# Patient Record
Sex: MALE | URBAN - METROPOLITAN AREA
[De-identification: names, ages, dates, MRNs, and addresses within clinical notes are randomized per-mention and may not be internally consistent; named-entity substitution may affect disease eponyms.]

---

## 2019-05-08 ENCOUNTER — OFFICE VISIT (OUTPATIENT)
Dept: URBAN - METROPOLITAN AREA CLINIC 58 | Facility: CLINIC | Age: 79
End: 2019-05-08
Payer: MEDICARE

## 2019-05-08 DIAGNOSIS — H18.52 EPITHELIAL CORNEAL DYSTROPHY: Primary | ICD-10-CM

## 2019-05-08 DIAGNOSIS — H02.883 MEIBOMIAN GLAND DYSFUNCTION OF RIGHT EYE, UNSPECIFIED EYELID: ICD-10-CM

## 2019-05-08 PROCEDURE — 99204 OFFICE O/P NEW MOD 45 MIN: CPT | Performed by: OPTOMETRIST

## 2019-05-08 PROCEDURE — 92133 CPTRZD OPH DX IMG PST SGM ON: CPT | Performed by: OPTOMETRIST

## 2019-05-08 RX ORDER — FLUOROMETHOLONE 1 MG/ML
0.1 % SUSPENSION/ DROPS OPHTHALMIC
Qty: 5 | Refills: 0 | Status: INACTIVE
Start: 2019-05-08 | End: 2019-06-19

## 2019-05-08 ASSESSMENT — INTRAOCULAR PRESSURE
OD: 13
OS: 13

## 2019-05-08 NOTE — IMPRESSION/PLAN
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 Plan: Discussed diagnosis in detail with patient. Discussed treatment options with patient. Patient instructed to apply warm compresses. Will continue to observe.

## 2019-05-08 NOTE — IMPRESSION/PLAN
Impression: Drusen of optic disc, bilateral: H47.323. Plan: Discussed diagnosis in detail with patient. Discussed treatment options with patient. Cont. Simbrinza OU BID.   Pt says he was previously diagnosed with glaucoma

## 2019-05-08 NOTE — IMPRESSION/PLAN
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 Plan: Discussed diagnosis in detail with patient. Discussed treatment options with patient. Ruthann Perez PRN.

## 2019-05-08 NOTE — IMPRESSION/PLAN
Impression: Dry eye syndrome of bilateral lacrimal glands: H04.123. Plan: Diagnosis explained in detail, FML .1% 1gtt OU tid x 1 wk then bid x 1 wk then qd x 1 wk then d/c.  Will continue to monitor the condition

## 2019-06-19 ENCOUNTER — OFFICE VISIT (OUTPATIENT)
Dept: URBAN - METROPOLITAN AREA CLINIC 58 | Facility: CLINIC | Age: 79
End: 2019-06-19
Payer: MEDICARE

## 2019-06-19 DIAGNOSIS — H04.123 DRY EYE SYNDROME OF BILATERAL LACRIMAL GLANDS: Primary | ICD-10-CM

## 2019-06-19 DIAGNOSIS — H47.323 DRUSEN OF OPTIC DISC, BILATERAL: ICD-10-CM

## 2019-06-19 PROCEDURE — 99213 OFFICE O/P EST LOW 20 MIN: CPT | Performed by: OPTOMETRIST

## 2019-06-19 ASSESSMENT — INTRAOCULAR PRESSURE
OD: 13
OS: 13

## 2019-06-19 NOTE — IMPRESSION/PLAN
Impression: Drusen of optic disc, bilateral: H47.323. Plan: Discussed diagnosis in detail with patient. Discussed treatment options with patient. Cont. Simbrinza OU BID and Latanoprost QHS OU. Pt says he was previously diagnosed with glaucoma. Continue to monitor IOP and progression.

## 2019-06-19 NOTE — IMPRESSION/PLAN
Impression: Dry eye syndrome of bilateral lacrimal glands: H04.123. Plan: Diagnosis explained in detail. Stop FML . Condition has improved.  Will continue to monitor the condition

## 2019-06-19 NOTE — IMPRESSION/PLAN
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 Plan: Discussed diagnosis in detail with patient. Discussed treatment options with patient. Continue Ruthann 128 ointment PRN.